# Patient Record
Sex: MALE | Race: WHITE | Employment: PART TIME | URBAN - METROPOLITAN AREA
[De-identification: names, ages, dates, MRNs, and addresses within clinical notes are randomized per-mention and may not be internally consistent; named-entity substitution may affect disease eponyms.]

---

## 2019-12-11 ENCOUNTER — HOSPITAL ENCOUNTER (EMERGENCY)
Facility: HOSPITAL | Age: 24
Discharge: HOME | End: 2019-12-11
Attending: EMERGENCY MEDICINE
Payer: OTHER MISCELLANEOUS

## 2019-12-11 VITALS
SYSTOLIC BLOOD PRESSURE: 122 MMHG | DIASTOLIC BLOOD PRESSURE: 79 MMHG | TEMPERATURE: 98 F | WEIGHT: 114.63 LBS | OXYGEN SATURATION: 98 % | BODY MASS INDEX: 19 KG/M2 | RESPIRATION RATE: 14 BRPM | HEART RATE: 61 BPM

## 2019-12-11 DIAGNOSIS — S61.412A LACERATION OF LEFT HAND WITHOUT FOREIGN BODY, INITIAL ENCOUNTER: Primary | ICD-10-CM

## 2019-12-11 PROCEDURE — 99283 EMERGENCY DEPT VISIT LOW MDM: CPT

## 2019-12-11 PROCEDURE — 12001 RPR S/N/AX/GEN/TRNK 2.5CM/<: CPT

## 2019-12-11 PROCEDURE — 12001 RPR S/N/AX/GEN/TRNK 2.5CM/<: CPT | Performed by: PHYSICIAN ASSISTANT

## 2019-12-11 PROCEDURE — 99282 EMERGENCY DEPT VISIT SF MDM: CPT

## 2019-12-12 NOTE — ED INITIAL ASSESSMENT (HPI)
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 Unknown last TDaP. AAO x 4. 1/2 inch long by 1/4 inch wide laceration to left palm. Bleeding controlled.

## 2019-12-12 NOTE — ED PROVIDER NOTES
Patient Seen in: [facility] Emergency Department      History   Patient presents with:  Laceration Abrasion    Stated Complaint: laceration hand    HPI  Carleen Campo is a 70-year-old male who presents today for a laceration to the palmar aspect of his left unremarkable. Neurological: CN III - XII grossly intact, full strength and sensation in BL extremities        ED Course   Labs Reviewed - No data to display       Laceration repair procedure:   Procedure, risks, benefits and alternatives are discussed.   P Medication List

## (undated) NOTE — LETTER
Date & Time: 12/11/2019, 9:43 PM  Patient: Adriane Alpers  Encounter Provider(s):    Oriana Speaker, MD Nadira Garcia [place]       To Whom It May Concern:    Adriane Alpers was seen and treated in our department on 12/11/2019.  Please excuse him for work until 1

## (undated) NOTE — ED AVS SNAPSHOT
Cesar Brown   MRN: [id number]    Department:  [facility] Emergency Department   Date of Visit:  12/11/2019           Disclosure     Insurance plans vary and the physician(s) referred by the ER may not be covered by your plan.  Please contact your tell this physician (or your personal doctor if your instructions are to return to your personal doctor) about any new or lasting problems. The primary care or specialist physician will see patients referred from the [facility] Emergency Department.  Adrian Stanley